# Patient Record
Sex: MALE | Race: WHITE | NOT HISPANIC OR LATINO | Employment: FULL TIME | URBAN - METROPOLITAN AREA
[De-identification: names, ages, dates, MRNs, and addresses within clinical notes are randomized per-mention and may not be internally consistent; named-entity substitution may affect disease eponyms.]

---

## 2017-03-27 ENCOUNTER — OFFICE VISIT - HEALTHEAST (OUTPATIENT)
Dept: INTERNAL MEDICINE | Facility: CLINIC | Age: 32
End: 2017-03-27

## 2017-03-27 DIAGNOSIS — Z00.00 ROUTINE GENERAL MEDICAL EXAMINATION AT A HEALTH CARE FACILITY: ICD-10-CM

## 2017-03-27 DIAGNOSIS — L65.9 ALOPECIA: ICD-10-CM

## 2017-03-27 DIAGNOSIS — F41.9 ANXIETY: ICD-10-CM

## 2017-03-27 DIAGNOSIS — F32.1 MAJOR DEPRESSIVE DISORDER, SINGLE EPISODE, MODERATE (H): ICD-10-CM

## 2017-03-27 ASSESSMENT — MIFFLIN-ST. JEOR: SCORE: 1700.1

## 2017-03-28 LAB
CHOLEST SERPL-MCNC: 244 MG/DL
FASTING STATUS PATIENT QL REPORTED: NO
HDLC SERPL-MCNC: 49 MG/DL
LDLC SERPL CALC-MCNC: 168 MG/DL
TRIGL SERPL-MCNC: 133 MG/DL

## 2017-03-31 ENCOUNTER — COMMUNICATION - HEALTHEAST (OUTPATIENT)
Dept: INTERNAL MEDICINE | Facility: CLINIC | Age: 32
End: 2017-03-31

## 2017-06-29 ENCOUNTER — COMMUNICATION - HEALTHEAST (OUTPATIENT)
Dept: INTERNAL MEDICINE | Facility: CLINIC | Age: 32
End: 2017-06-29

## 2017-06-29 DIAGNOSIS — G47.00 INSOMNIA: ICD-10-CM

## 2017-06-29 DIAGNOSIS — F32.1 MAJOR DEPRESSIVE DISORDER, SINGLE EPISODE, MODERATE (H): ICD-10-CM

## 2017-06-30 ENCOUNTER — COMMUNICATION - HEALTHEAST (OUTPATIENT)
Dept: INTERNAL MEDICINE | Facility: CLINIC | Age: 32
End: 2017-06-30

## 2017-07-11 ENCOUNTER — COMMUNICATION - HEALTHEAST (OUTPATIENT)
Dept: INTERNAL MEDICINE | Facility: CLINIC | Age: 32
End: 2017-07-11

## 2017-07-11 DIAGNOSIS — G47.00 INSOMNIA: ICD-10-CM

## 2017-07-11 DIAGNOSIS — F32.1 MAJOR DEPRESSIVE DISORDER, SINGLE EPISODE, MODERATE (H): ICD-10-CM

## 2018-03-26 ENCOUNTER — COMMUNICATION - HEALTHEAST (OUTPATIENT)
Dept: INTERNAL MEDICINE | Facility: CLINIC | Age: 33
End: 2018-03-26

## 2018-03-26 DIAGNOSIS — L65.9 ALOPECIA: ICD-10-CM

## 2018-07-03 ENCOUNTER — COMMUNICATION - HEALTHEAST (OUTPATIENT)
Dept: INTERNAL MEDICINE | Facility: CLINIC | Age: 33
End: 2018-07-03

## 2018-07-03 DIAGNOSIS — F32.1 MAJOR DEPRESSIVE DISORDER, SINGLE EPISODE, MODERATE (H): ICD-10-CM

## 2018-07-03 DIAGNOSIS — G47.00 INSOMNIA: ICD-10-CM

## 2018-08-30 ENCOUNTER — OFFICE VISIT - HEALTHEAST (OUTPATIENT)
Dept: INTERNAL MEDICINE | Facility: CLINIC | Age: 33
End: 2018-08-30

## 2018-08-30 ENCOUNTER — HOSPITAL ENCOUNTER (OUTPATIENT)
Dept: ULTRASOUND IMAGING | Facility: CLINIC | Age: 33
Discharge: HOME | End: 2018-08-30
Attending: INTERNAL MEDICINE

## 2018-08-30 ENCOUNTER — COMMUNICATION - HEALTHEAST (OUTPATIENT)
Dept: INTERNAL MEDICINE | Facility: CLINIC | Age: 33
End: 2018-08-30

## 2018-08-30 DIAGNOSIS — N50.811 TESTICULAR PAIN, RIGHT: ICD-10-CM

## 2018-08-30 DIAGNOSIS — N45.1 EPIDIDYMITIS, RIGHT: ICD-10-CM

## 2018-08-30 LAB
ALBUMIN UR-MCNC: NEGATIVE MG/DL
APPEARANCE UR: CLEAR
BILIRUB UR QL STRIP: NEGATIVE
COLOR UR AUTO: YELLOW
GLUCOSE UR STRIP-MCNC: NEGATIVE MG/DL
HGB UR QL STRIP: NEGATIVE
KETONES UR STRIP-MCNC: NEGATIVE MG/DL
LEUKOCYTE ESTERASE UR QL STRIP: NEGATIVE
NITRATE UR QL: NEGATIVE
PH UR STRIP: 7 [PH] (ref 5–8)
SP GR UR STRIP: 1.01 (ref 1–1.03)
UROBILINOGEN UR STRIP-ACNC: NORMAL

## 2018-08-30 ASSESSMENT — MIFFLIN-ST. JEOR: SCORE: 1699.19

## 2018-08-31 ENCOUNTER — COMMUNICATION - HEALTHEAST (OUTPATIENT)
Dept: INTERNAL MEDICINE | Facility: CLINIC | Age: 33
End: 2018-08-31

## 2018-08-31 LAB
C TRACH DNA SPEC QL PROBE+SIG AMP: NEGATIVE
N GONORRHOEA DNA SPEC QL NAA+PROBE: NEGATIVE

## 2018-11-17 ENCOUNTER — COMMUNICATION - HEALTHEAST (OUTPATIENT)
Dept: INTERNAL MEDICINE | Facility: CLINIC | Age: 33
End: 2018-11-17

## 2018-12-13 ENCOUNTER — COMMUNICATION - HEALTHEAST (OUTPATIENT)
Dept: INTERNAL MEDICINE | Facility: CLINIC | Age: 33
End: 2018-12-13

## 2018-12-13 DIAGNOSIS — L65.9 ALOPECIA: ICD-10-CM

## 2019-04-05 ENCOUNTER — COMMUNICATION - HEALTHEAST (OUTPATIENT)
Dept: INTERNAL MEDICINE | Facility: CLINIC | Age: 34
End: 2019-04-05

## 2019-06-25 ENCOUNTER — COMMUNICATION - HEALTHEAST (OUTPATIENT)
Dept: INTERNAL MEDICINE | Facility: CLINIC | Age: 34
End: 2019-06-25

## 2019-06-25 DIAGNOSIS — F32.1 MAJOR DEPRESSIVE DISORDER, SINGLE EPISODE, MODERATE (H): ICD-10-CM

## 2019-06-25 DIAGNOSIS — G47.00 INSOMNIA: ICD-10-CM

## 2019-10-05 ENCOUNTER — COMMUNICATION - HEALTHEAST (OUTPATIENT)
Dept: INTERNAL MEDICINE | Facility: CLINIC | Age: 34
End: 2019-10-05

## 2019-10-05 DIAGNOSIS — L65.9 ALOPECIA: ICD-10-CM

## 2020-06-27 ENCOUNTER — COMMUNICATION - HEALTHEAST (OUTPATIENT)
Dept: INTERNAL MEDICINE | Facility: CLINIC | Age: 35
End: 2020-06-27

## 2020-06-27 DIAGNOSIS — L65.9 ALOPECIA: ICD-10-CM

## 2020-06-29 ENCOUNTER — COMMUNICATION - HEALTHEAST (OUTPATIENT)
Dept: INTERNAL MEDICINE | Facility: CLINIC | Age: 35
End: 2020-06-29

## 2020-06-29 DIAGNOSIS — F32.1 MAJOR DEPRESSIVE DISORDER, SINGLE EPISODE, MODERATE (H): ICD-10-CM

## 2020-06-29 DIAGNOSIS — G47.00 INSOMNIA: ICD-10-CM

## 2021-04-04 ENCOUNTER — COMMUNICATION - HEALTHEAST (OUTPATIENT)
Dept: INTERNAL MEDICINE | Facility: CLINIC | Age: 36
End: 2021-04-04

## 2021-04-04 DIAGNOSIS — L65.9 ALOPECIA: ICD-10-CM

## 2021-05-30 VITALS — WEIGHT: 183.5 LBS | HEIGHT: 66 IN | BODY MASS INDEX: 29.49 KG/M2

## 2021-05-30 NOTE — TELEPHONE ENCOUNTER
RN cannot approve Refill Request    RN can NOT refill this medication Protocol failed and NO refill given.       Tomasa Ferris, [facility] Triage/Med Refill 6/26/2019    Requested Prescriptions   Pending Prescriptions Disp Refills     sertraline (ZOLOFT) 100 MG tablet [Pharmacy Med Name: SERTRALINE  MG TABLET] 135 tablet 3     Sig: TAKE 1 AND 1/2 TABLETS BY MOUTH ONCE DAILY       SSRI Refill Protocol  Failed - 6/25/2019  1:19 AM        Failed - PCP or prescribing provider visit in last year     Last office visit with prescriber/PCP: Visit date not found OR same dept: Visit date not found OR same specialty: 8/30/2018 Ros Nunez MD  Last physical: 3/27/2017 Last MTM visit: Visit date not found   Next visit within 3 mo: Visit date not found  Next physical within 3 mo: Visit date not found  Prescriber OR PCP: Barry Olea MD  Last diagnosis associated with med order: 1. Major depressive disorder, single episode, moderate (H)  - sertraline (ZOLOFT) 100 MG tablet [Pharmacy Med Name: SERTRALINE  MG TABLET]; TAKE 1 AND 1/2 TABLETS BY MOUTH ONCE DAILY  Dispense: 135 tablet; Refill: 3    2. Insomnia  - traZODone (DESYREL) 50 MG tablet [Pharmacy Med Name: TRAZODONE 50 MG TABLET]; TAKE 2 TABLETS BY MOUTH AT BEDTIME AS NEEDED FOR INSOMNIA  Dispense: 180 tablet; Refill: 3    If protocol passes may refill for 12 months if within 3 months of last provider visit (or a total of 15 months).             traZODone (DESYREL) 50 MG tablet [Pharmacy Med Name: TRAZODONE 50 MG TABLET] 180 tablet 3     Sig: TAKE 2 TABLETS BY MOUTH AT BEDTIME AS NEEDED FOR INSOMNIA       Tricyclics/Misc Antidepressant/Antianxiety Meds Refill Protocol Failed - 6/25/2019  1:19 AM        Failed - PCP or prescribing provider visit in last year     Last office visit with prescriber/PCP: Visit date not found OR same dept: Visit date not found OR same specialty: 8/30/2018 Ros Nunez MD  Last physical: 3/27/2017 Last MTM visit:  Visit date not found   Next visit within 3 mo: Visit date not found  Next physical within 3 mo: Visit date not found  Prescriber OR PCP: Barry Olea MD  Last diagnosis associated with med order: 1. Major depressive disorder, single episode, moderate (H)  - sertraline (ZOLOFT) 100 MG tablet [Pharmacy Med Name: SERTRALINE  MG TABLET]; TAKE 1 AND 1/2 TABLETS BY MOUTH ONCE DAILY  Dispense: 135 tablet; Refill: 3    2. Insomnia  - traZODone (DESYREL) 50 MG tablet [Pharmacy Med Name: TRAZODONE 50 MG TABLET]; TAKE 2 TABLETS BY MOUTH AT BEDTIME AS NEEDED FOR INSOMNIA  Dispense: 180 tablet; Refill: 3    If protocol passes may refill for 12 months if within 3 months of last provider visit (or a total of 15 months).

## 2021-06-01 VITALS — BODY MASS INDEX: 29.46 KG/M2 | WEIGHT: 183.3 LBS | HEIGHT: 66 IN

## 2021-06-09 NOTE — TELEPHONE ENCOUNTER
RN cannot approve Refill Request    RN can NOT refill this medication med is not covered by policy/route to provider. Last office visit: Visit date not found Last Physical: 3/27/2017 Last MTM visit: Visit date not found Last visit same specialty: 8/30/2018 Ros Nunez MD.  Next visit within 3 mo: Visit date not found  Next physical within 3 mo: Visit date not found      Mora Arndt, [facility] Triage/Med Refill 6/27/2020    Requested Prescriptions   Pending Prescriptions Disp Refills     finasteride (PROSCAR) 5 mg tablet [Pharmacy Med Name: FINASTERIDE 5 MG TABLET] 45 tablet 2     Sig: TAKE ONE-HALF TABLET (2.5 MG TOTAL) BY MOUTH DAILY.       There is no refill protocol information for this order

## 2021-06-09 NOTE — TELEPHONE ENCOUNTER
RN cannot approve Refill Request    RN can NOT refill this medication PCP messaged that patient is overdue for Office Visit. Last office visit: Visit date not found Last Physical: 3/27/2017 Last MTM visit: Visit date not found Last visit same specialty: 8/30/2018 Ros Nunez MD.  Next visit within 3 mo: Visit date not found  Next physical within 3 mo: Visit date not found      Marni Sherman, [facility] Connection Triage/Med Refill 6/29/2020    Requested Prescriptions   Pending Prescriptions Disp Refills     sertraline (ZOLOFT) 100 MG tablet [Pharmacy Med Name: SERTRALINE  MG TABLET] 135 tablet 3     Sig: TAKE 1 AND 1/2 TABLETS BY MOUTH ONCE DAILY       SSRI Refill Protocol  Failed - 6/29/2020 12:13 AM        Failed - PCP or prescribing provider visit in last year     Last office visit with prescriber/PCP: Visit date not found OR same dept: Visit date not found OR same specialty: 8/30/2018 Ros Nunez MD  Last physical: 3/27/2017 Last MTM visit: Visit date not found   Next visit within 3 mo: Visit date not found  Next physical within 3 mo: Visit date not found  Prescriber OR PCP: Barry Olea MD  Last diagnosis associated with med order: 1. Major depressive disorder, single episode, moderate (H)  - sertraline (ZOLOFT) 100 MG tablet [Pharmacy Med Name: SERTRALINE  MG TABLET]; TAKE 1 AND 1/2 TABLETS BY MOUTH ONCE DAILY  Dispense: 135 tablet; Refill: 3    2. Insomnia  - traZODone (DESYREL) 50 MG tablet [Pharmacy Med Name: TRAZODONE 50 MG TABLET]; TAKE 2 TABLETS BY MOUTH AT BEDTIME AS NEEDED FOR INSOMNIA  Dispense: 180 tablet; Refill: 3    If protocol passes may refill for 12 months if within 3 months of last provider visit (or a total of 15 months).                traZODone (DESYREL) 50 MG tablet [Pharmacy Med Name: TRAZODONE 50 MG TABLET] 180 tablet 3     Sig: TAKE 2 TABLETS BY MOUTH AT BEDTIME AS NEEDED FOR INSOMNIA       Tricyclics/Misc Antidepressant/Antianxiety Meds Refill Protocol  Failed - 6/29/2020 12:13 AM        Failed - PCP or prescribing provider visit in last year     Last office visit with prescriber/PCP: Visit date not found OR same dept: Visit date not found OR same specialty: 8/30/2018 Ros Nunez MD  Last physical: 3/27/2017 Last MTM visit: Visit date not found   Next visit within 3 mo: Visit date not found  Next physical within 3 mo: Visit date not found  Prescriber OR PCP: Barry Olea MD  Last diagnosis associated with med order: 1. Major depressive disorder, single episode, moderate (H)  - sertraline (ZOLOFT) 100 MG tablet [Pharmacy Med Name: SERTRALINE  MG TABLET]; TAKE 1 AND 1/2 TABLETS BY MOUTH ONCE DAILY  Dispense: 135 tablet; Refill: 3    2. Insomnia  - traZODone (DESYREL) 50 MG tablet [Pharmacy Med Name: TRAZODONE 50 MG TABLET]; TAKE 2 TABLETS BY MOUTH AT BEDTIME AS NEEDED FOR INSOMNIA  Dispense: 180 tablet; Refill: 3    If protocol passes may refill for 12 months if within 3 months of last provider visit (or a total of 15 months).

## 2021-06-09 NOTE — PROGRESS NOTES
ASSESSMENT:  1. Routine general medical examination at a health care facility  Stable.  Feels well.  Update labs  - Vitamin D, Total (25-Hydroxy)  - Lipid Cascade  - Glucose    2. Anxiety  Continue medications per psychiatry.  Given his stable course, I offered that we could take over these medications    3. Major depressive disorder, single episode, moderate  As above    4. Alopecia  Previously improved on trial of Propecia/finasteride.  Will resume    Erectile dysfunction.  Undoubtedly related at least in part to SSRI.  Okay for Cialis as needed    PLAN:  Patient Instructions   With family history of heart:    Be more aggressive on blood pressure, keeping it below 130/80 at all times.  Normally the range 140/90.  Monitor it periodically to make sure    I would be more aggressive.  Treat normally an LDL over 190 and no risk factors, and LDL over 160 with 2 risk factors.  You have one, a man.  You have 2, family.      Do not smoke    Take a baby aspirin to help prevent heart attack?      No cancer screening until age 50    No shots until age 60    Depending on how stable you are, we can take over the meds for psych    Finasteride 5 mgs, take one half pill every other day, equals roughly 1 mg daily    Sign up for mychart      Orders Placed This Encounter   Procedures     Vitamin D, Total (25-Hydroxy)     Lipid Cascade     Order Specific Question:   Fasting is required?     Answer:   Unknown     Glucose     There are no discontinued medications.    No Follow-up on file.    CHIEF COMPLAINT:  Chief Complaint   Patient presents with     Annual Exam       HISTORY OF PRESENT ILLNESS:  Wang is a 31 y.o. male presenting to the clinic today for a physical exam and to establish care. He has not a physical exam in several years. He has no major medical concerns and has been feeling well overall.     Depression: He has been prescribed Zoloft through [facility]. He follows up with his psychiatrist 2-3 times per year.  He was  admitted in 2008 for opiate addiction, and has been sober since.     Insomnia: This is managed through trazodone. He used to have difficulty falling asleep, and could not fall back asleep if he woke up in the middle of the night.     Erectile dysfunction: He has history of erectile dysfunction. This dates back to when he received treatment for opiate addiction in 2008. He is able to achieve an erection, but feels that they do not last long enough. He has avoided establishing relationships in the past due to lack of sexual performance. Of note, he was on finasteride in the past, and was warned that this could potential cause sexual dysfunction. He has thought that his sexual dysfunction is psychogenic.     Hair loss: He has taken finasteride in the past with some success. Now that he has been off of this medication, he has noticed more hair loss.     Family history: He notes that malignant hyperthermia runs in his family. His father recently had surgery, and developed muscle rigidity and high temperatures post operatively.     REVIEW OF SYSTEMS:   Psychological is positive for depressed mood and anxiety. He denies shortness of breath or dyspnea. He leads an active lifestyle. He denies arthralgias. Bowel movements are regular. He denies dysuria. He does have concerns of excessive thirst. All other systems are negative.    PFSH:  History   Smoking Status     Never Smoker   Smokeless Tobacco     Not on file       Family History   Problem Relation Age of Onset     Malig Hyperthermia Father      Heart attack Father 50     Aortic aneurysm Father      Heart disease Father      Hypertension Father      Mental illness Father      Heart attack Paternal Grandfather      Heart attack Paternal Uncle      Mental illness Mother      No Medical Problems Brother        Social History     Social History     Marital status: Single     Spouse name: N/A     Number of children: N/A     Years of education: N/A     Occupational History      " for [employer]      Social History Main Topics     Smoking status: Never Smoker     Smokeless tobacco: Not on file     Alcohol use No     Drug use: Not on file      Comment: previous opiate abuse     Sexual activity: Not on file     Other Topics Concern     Not on file     Social History Narrative    He does cardio and lifts weights 6 times per week.        Past Surgical History:   Procedure Laterality Date     NO PAST SURGERIES         No Known Allergies    Active Ambulatory Problems     Diagnosis Date Noted     Male Erectile Disorder      Opioid Abuse - In Remission      Blood Pressure Isolated Elevated      Anxiety 03/27/2017     Major depressive disorder, single episode, moderate 03/27/2017     Resolved Ambulatory Problems     Diagnosis Date Noted     No Resolved Ambulatory Problems     Past Medical History:   Diagnosis Date     Depression      Erectile dysfunction      Opiate addiction        VITALS:  Vitals:    03/27/17 1626   BP: 138/86   Pulse: 80   Weight: 183 lb 8 oz (83.2 kg)   Height: 5' 6\" (1.676 m)     Wt Readings from Last 3 Encounters:   03/27/17 183 lb 8 oz (83.2 kg)     Body mass index is 29.62 kg/(m^2).    PHYSICAL EXAM:  Constitutional:  Reveals an alert, pleasant, healthy appearing young male.  Vitals:  Per nursing notes.  Ears:  Clear.  Oropharynx:  Without posterior nasal drainage or thrush.  Neck:  Supple, thyroid not palpable.  Cardiac:  Regular rate and rhythm without murmurs, rubs, or gallops.  Legs without edema. Palpation of the radial pulse regular.  Lungs: Clear.  Respiratory effort normal.  Abdomen:   Bowel sounds positive, nontender, nondistended.  Neither liver nor spleen palpable.  Neurologic:  Cranial nerves II-XII intact.     Psychiatric:  Mood appropriate, memory intact.     QUALITY MEASURES:  The following high BMI interventions were performed this visit: encouragement to exercise    ADDITIONAL HISTORY SUMMARIZED (2): Reviewed Dr. Moran's note from 10/2/12 " regarding sexual dysfunction.   DECISION TO OBTAIN EXTRA INFORMATION (1): None.   RADIOLOGY TESTS (1): None.  LABS (1): Ordered labs today.   MEDICINE TESTS (1): None.  INDEPENDENT REVIEW (2 each): None.     The visit lasted a total of 24 minutes face to face with the patient. Over 50% of the time was spent counseling and educating the patient about health maintenance.    IAbbey, am scribing for and in the presence of, Dr. Olea.    I, Dr. Olea, personally performed the services described in this documentation, as scribed by Abbey Prescott in my presence, and it is both accurate and complete.    MEDICATIONS:  Current Outpatient Prescriptions   Medication Sig Dispense Refill     finasteride (PROSCAR) 5 mg tablet Take 0.5 tablets (2.5 mg total) by mouth daily. 45 tablet 3     sertraline (ZOLOFT) 100 MG tablet TAKE 1 AND 1/2 TABLETS BY MOUTH ONCE DAILY  1     tadalafil (CIALIS) 10 MG tablet Take 1 tablet (10 mg total) by mouth daily as needed for erectile dysfunction. 10 tablet 5     traZODone (DESYREL) 50 MG tablet TAKE 2 TABLETS BY MOUTH AT BEDTIME AS NEEDED FOR INSOMNIA  1     No current facility-administered medications for this visit.        Total data points: 3

## 2021-06-15 PROBLEM — F32.1 MAJOR DEPRESSIVE DISORDER, SINGLE EPISODE, MODERATE (H): Status: ACTIVE | Noted: 2017-03-27

## 2021-06-15 PROBLEM — F41.9 ANXIETY: Status: ACTIVE | Noted: 2017-03-27

## 2021-06-16 PROBLEM — G47.00 INSOMNIA: Status: ACTIVE | Noted: 2017-06-29

## 2021-06-16 NOTE — TELEPHONE ENCOUNTER
RN cannot approve Refill Request    RN can NOT refill this medication Protocol failed and NO refill given. Last office visit: Visit date not found Last Physical: 3/27/2017 Last MTM visit: Visit date not found Last visit same specialty: 8/30/2018 Ros Nunez MD.  Next visit within 3 mo: Visit date not found  Next physical within 3 mo: Visit date not found      Chantal Solomon, [facility] Triage/Med Refill 4/4/2021    Requested Prescriptions   Pending Prescriptions Disp Refills     finasteride (PROSCAR) 5 mg tablet [Pharmacy Med Name: FINASTERIDE 5 MG TABLET] 45 tablet 2     Sig: TAKE ONE-HALF TABLET (2.5 MG TOTAL) BY MOUTH DAILY.       There is no refill protocol information for this order

## 2021-06-20 NOTE — PROGRESS NOTES
ASSESSMENT and PLAN:  1. Testicular pain, right  I will get an ultrasound today.  In the differential, would be a testicular appendiceal torsion.  Also looking for infection; he denies the possibility of sexually transmitted disease.  Pending the results of the labs and ultrasound, will determine treatment course.  - US Scrotum and Testicles W Duplex Ltd; Future  - Urinalysis-UC if Indicated  - Chlamydia trachomatis & Neisseria gonorrhoeae, Amplified Detection    2. Epididymitis, right  His ultrasound was a read and call.  It was consistent with epididymitis but nothing requiring surgical intervention was noted.  He will be treated with 10 days of levofloxacin.  He will follow-up if he is worsening or not improving.  - levoFLOXacin (LEVAQUIN) 500 MG tablet; Take 1 tablet (500 mg total) by mouth daily for 10 days.  Dispense: 10 tablet; Refill: 0    There are no discontinued medications.    No Follow-up on file.    Patient Instructions   We'll get the ultrasound today:  [phone]    After that's scheduled, we'll have you leave a urine sample.    We'll be in touch when results are available this afternoon.    Take care!      CHIEF COMPLAINT:  Chief Complaint   Patient presents with     Testicle Pain     2 days- No specific injury       HISTORY OF PRESENT ILLNESS:  Wang Cano is a 32 y.o. male  presenting to the clinic today for evaluation of right testicular pain.  It started yesterday morning and got progressively worse throughout the day.  When he laid down overnight and woke up this morning, it felt a little bit better.  He has noticed no redness of the scrotum.  He has some mild swelling.  It is worse with movement.  He tried to exercise this morning but due to pain he was only able to do a couple repetitions and his weight lifting and then he had to stop.  He has had no prior abdominal surgeries.  He denies any concerns for sexually transmitted infection.  He notes that his right testicle is exquisitely  "sensitive right now.  He has not had any fevers or chills.  He has not had any penile discharge.    REVIEW OF SYSTEMS:    All other systems are negative.    PFSH:  Family History   Problem Relation Age of Onset     Malig Hyperthermia Father      Heart attack Father 50     Aortic aneurysm Father      Heart disease Father      Hypertension Father      Mental illness Father      Heart attack Paternal Grandfather      Heart attack Paternal Uncle      Mental illness Mother      No Medical Problems Brother      TOBACCO USE:  History   Smoking Status     Never Smoker   Smokeless Tobacco     Never Used       VITALS:  Vitals:    08/30/18 1252   BP: 130/76   Patient Site: Right Arm   Patient Position: Sitting   Cuff Size: Adult Large   Pulse: 84   Weight: 183 lb 4.8 oz (83.1 kg)   Height: 5' 6\" (1.676 m)     Wt Readings from Last 3 Encounters:   08/30/18 183 lb 4.8 oz (83.1 kg)   03/27/17 183 lb 8 oz (83.2 kg)         PHYSICAL EXAM:  Constitutional:  Reveals an alert, pleasant adult male.   Vitals:  Noted.   : Normal adult male external genitalia, penis is circumcised.  Right testicle is mildly swollen, he is tender superiorly and anteriorly.  No posterior tenderness, no hernias appreciated, no erythema or rash noted, no penile discharge    QUALITY MEASURES:  The following high BMI interventions were performed this visit: weight monitoring    MEDICATIONS:  Current Outpatient Prescriptions   Medication Sig Dispense Refill     finasteride (PROSCAR) 5 mg tablet Take 0.5 tablets (2.5 mg total) by mouth daily. 45 tablet 2     sertraline (ZOLOFT) 100 MG tablet TAKE 1 AND 1/2 TABLETS BY MOUTH ONCE DAILY 135 tablet 3     traZODone (DESYREL) 50 MG tablet TAKE 2 TABLETS BY MOUTH AT BEDTIME AS NEEDED FOR INSOMNIA 180 tablet 3     doxycycline (VIBRA-TABS) 100 MG tablet Take 1 tablet (100 mg total) by mouth 2 (two) times a day for 10 days. 20 tablet 0     levoFLOXacin (LEVAQUIN) 500 MG tablet Take 1 tablet (500 mg total) by mouth daily " for 10 days. 10 tablet 0     No current facility-administered medications for this visit.

## 2021-06-27 ENCOUNTER — HEALTH MAINTENANCE LETTER (OUTPATIENT)
Age: 36
End: 2021-06-27

## 2021-07-03 ENCOUNTER — COMMUNICATION - HEALTHEAST (OUTPATIENT)
Dept: INTERNAL MEDICINE | Facility: CLINIC | Age: 36
End: 2021-07-03

## 2021-07-03 DIAGNOSIS — G47.00 INSOMNIA: ICD-10-CM

## 2021-07-03 DIAGNOSIS — F32.1 MAJOR DEPRESSIVE DISORDER, SINGLE EPISODE, MODERATE (H): ICD-10-CM

## 2021-10-17 ENCOUNTER — HEALTH MAINTENANCE LETTER (OUTPATIENT)
Age: 36
End: 2021-10-17

## 2022-01-03 DIAGNOSIS — L65.9 ALOPECIA: Primary | ICD-10-CM

## 2022-01-03 RX ORDER — FINASTERIDE 5 MG/1
5 TABLET, FILM COATED ORAL
COMMUNITY
Start: 2021-04-05 | End: 2022-01-03

## 2022-01-06 RX ORDER — FINASTERIDE 5 MG/1
5 TABLET, FILM COATED ORAL EVERY OTHER DAY
Qty: 45 TABLET | Refills: 3 | Status: SHIPPED | OUTPATIENT
Start: 2022-01-06 | End: 2023-01-01

## 2022-01-06 NOTE — TELEPHONE ENCOUNTER
"Routing refill request to provider for review/approval because:  Patient needs to be seen because it has been more than 1 year since last office visit.    Last Written Prescription Date:  4/5/21  Last Fill Quantity: 45,  # refills: 2   Last office visit provider: 8/30/18      Requested Prescriptions   Pending Prescriptions Disp Refills     finasteride (PROSCAR) 5 MG tablet       Sig: Take 1 tablet (5 mg) by mouth       BPH Agents Failed - 1/3/2022 12:19 PM        Failed - Recent (12 mo) or future (30 days) visit within the authorizing provider's department     Patient has had an office visit with the authorizing provider or a provider within the authorizing providers department within the previous 12 mos or has a future within next 30 days. See \"Patient Info\" tab in inbasket, or \"Choose Columns\" in Meds & Orders section of the refill encounter.              Passed - Medication is active on med list        Passed - Patient is 18 years of age or older         Signed Prescriptions Disp Refills    finasteride (PROSCAR) 5 MG tablet       Sig: Take 5 mg by mouth       There is no refill protocol information for this order          Monie Zavala RN 01/06/22 10:40 AM  "

## 2022-07-05 DIAGNOSIS — F32.1 MAJOR DEPRESSIVE DISORDER, SINGLE EPISODE, MODERATE (H): ICD-10-CM

## 2022-07-05 DIAGNOSIS — G47.00 INSOMNIA, UNSPECIFIED: ICD-10-CM

## 2022-07-05 RX ORDER — SERTRALINE HYDROCHLORIDE 100 MG/1
TABLET, FILM COATED ORAL
Qty: 135 TABLET | Refills: 3 | OUTPATIENT
Start: 2022-07-05

## 2022-07-05 RX ORDER — TRAZODONE HYDROCHLORIDE 50 MG/1
TABLET, FILM COATED ORAL
Qty: 180 TABLET | Refills: 3 | OUTPATIENT
Start: 2022-07-05

## 2022-07-11 ENCOUNTER — TELEPHONE (OUTPATIENT)
Dept: INTERNAL MEDICINE | Facility: CLINIC | Age: 37
End: 2022-07-11

## 2022-07-11 RX ORDER — TRAZODONE HYDROCHLORIDE 50 MG/1
TABLET, FILM COATED ORAL
Qty: 180 TABLET | Refills: 3 | OUTPATIENT
Start: 2022-07-11

## 2022-07-11 NOTE — TELEPHONE ENCOUNTER
Reason for Call:  Other call back    Detailed comments: pt has not been seen since 2018, asking for refill of Trazadone    Needs to make a new patient appt    Please advise    Please call patient when OK by Dr Olea    Phone Number Patient can be reached at: Cell number on file:    Telephone Information:   Mobile [phone]       Best Time: anytime    Can we leave a detailed message on this number? YES         Call taken on 7/11/2022 at 3:20 PM by Yuli Burgess

## 2022-07-13 ENCOUNTER — VIRTUAL VISIT (OUTPATIENT)
Dept: INTERNAL MEDICINE | Facility: CLINIC | Age: 37
End: 2022-07-13
Payer: COMMERCIAL

## 2022-07-13 DIAGNOSIS — F32.1 MAJOR DEPRESSIVE DISORDER, SINGLE EPISODE, MODERATE (H): ICD-10-CM

## 2022-07-13 DIAGNOSIS — F11.11 OPIOID ABUSE, IN REMISSION (H): ICD-10-CM

## 2022-07-13 DIAGNOSIS — F41.1 GENERALIZED ANXIETY DISORDER: Primary | ICD-10-CM

## 2022-07-13 DIAGNOSIS — F51.01 PRIMARY INSOMNIA: ICD-10-CM

## 2022-07-13 DIAGNOSIS — Z11.4 SCREENING FOR HIV (HUMAN IMMUNODEFICIENCY VIRUS): ICD-10-CM

## 2022-07-13 DIAGNOSIS — Z13.220 SCREENING FOR HYPERLIPIDEMIA: ICD-10-CM

## 2022-07-13 DIAGNOSIS — Z11.59 NEED FOR HEPATITIS C SCREENING TEST: ICD-10-CM

## 2022-07-13 DIAGNOSIS — Z00.00 PREVENTATIVE HEALTH CARE: ICD-10-CM

## 2022-07-13 PROCEDURE — 99204 OFFICE O/P NEW MOD 45 MIN: CPT | Mod: 95 | Performed by: INTERNAL MEDICINE

## 2022-07-13 PROCEDURE — 96127 BRIEF EMOTIONAL/BEHAV ASSMT: CPT | Mod: 95 | Performed by: INTERNAL MEDICINE

## 2022-07-13 RX ORDER — TRAZODONE HYDROCHLORIDE 50 MG/1
TABLET, FILM COATED ORAL
Qty: 180 TABLET | Refills: 3 | Status: CANCELLED | OUTPATIENT
Start: 2022-07-13

## 2022-07-13 RX ORDER — SERTRALINE HYDROCHLORIDE 100 MG/1
150 TABLET, FILM COATED ORAL DAILY
Qty: 135 TABLET | Refills: 3 | Status: SHIPPED | OUTPATIENT
Start: 2022-07-13 | End: 2023-06-30

## 2022-07-13 RX ORDER — TRAZODONE HYDROCHLORIDE 50 MG/1
TABLET, FILM COATED ORAL
COMMUNITY
Start: 2021-07-03 | End: 2022-07-13

## 2022-07-13 RX ORDER — TRAZODONE HYDROCHLORIDE 100 MG/1
100 TABLET ORAL AT BEDTIME
Qty: 90 TABLET | Refills: 3 | Status: SHIPPED | OUTPATIENT
Start: 2022-07-13 | End: 2023-06-30

## 2022-07-13 ASSESSMENT — PATIENT HEALTH QUESTIONNAIRE - PHQ9
SUM OF ALL RESPONSES TO PHQ QUESTIONS 1-9: 1
SUM OF ALL RESPONSES TO PHQ QUESTIONS 1-9: 1

## 2022-07-13 NOTE — PROGRESS NOTES
Wang is a 36 year old male contacting the clinic today via video, who will use the platform: [employer] for the visit.  Phone # for [employer], or if Amwell drops:   Telephone Information:   Mobile [phone]          ASSESSMENT and PLAN:  1. Generalized anxiety disorder  Clarify meds and refill  - sertraline (ZOLOFT) 100 MG tablet; Take 1.5 tablets (150 mg) by mouth daily  Dispense: 135 tablet; Refill: 3  - traZODone (DESYREL) 100 MG tablet; Take 1 tablet (100 mg) by mouth At Bedtime  Dispense: 90 tablet; Refill: 3    2. Major depressive disorder, single episode, moderate (H)  - sertraline (ZOLOFT) 100 MG tablet; Take 1.5 tablets (150 mg) by mouth daily  Dispense: 135 tablet; Refill: 3  - traZODone (DESYREL) 100 MG tablet; Take 1 tablet (100 mg) by mouth At Bedtime  Dispense: 90 tablet; Refill: 3  - Basic metabolic panel; Future  - TSH; Future  - Vitamin B12; Future  - Vitamin D Deficiency; Future    3. Primary insomnia  - traZODone (DESYREL) 100 MG tablet; Take 1 tablet (100 mg) by mouth At Bedtime  Dispense: 90 tablet; Refill: 3    4. Screening for HIV (human immunodeficiency virus)  - HIV Antigen Antibody Combo; Future    5. Need for hepatitis C screening test  - Hepatitis C Screen Reflex to HCV RNA Quant and Genotype; Future    6. Screening for hyperlipidemia  - Lipid panel reflex to direct LDL Fasting; Future    7. Preventative health care  - REVIEW OF HEALTH MAINTENANCE PROTOCOL ORDERS  - HIV Antigen Antibody Combo; Future  - Hepatitis C Screen Reflex to HCV RNA Quant and Genotype; Future  - TDAP VACCINE (Adacel, Boostrix); Future  - Lipid panel reflex to direct LDL Fasting; Future    8. Opioid Abuse - In Remission  Sober and off OxyContin since 2008       Patient Instructions   Clarify and refill sertraline and trazodone    Change trazodone to 100 mg tabs at bed    Update labs    Tdap            Return in about 1 year (around 7/13/2023) for using a video visit.       CHIEF COMPLAINT:  Chief Complaint   Patient  "presents with     Re-Establish care      Last Seen 2018     Medication Follow-up       HISTORY OF PRESENT ILLNESS:  Wang is a 36 year old male contacting the clinic today via video for medication review and to plug back in.  He has not been seen for a few years.  He takes sertraline 150 mg and trazodone 100 mg daily and has for many years.  He wishes to reestablish care.  Current dosages are doing well    He also takes finasteride for hair loss    Health maintenance reviewed.  He feels well    REVIEW OF SYSTEMS:   No pain    PFSH:  Social History     Social History Narrative    Single    Sales for security       TOBACCO USE:  History   Smoking Status     Never Smoker   Smokeless Tobacco     Never Used       VITALS:  There were no vitals filed for this visit.  There were no vitals taken for this visit. Estimated body mass index is 29.59 kg/m  as calculated from the following:    Height as of 8/30/18: 1.676 m (5' 6\").    Weight as of 8/30/18: 83.1 kg (183 lb 4.8 oz).    PHYSICAL EXAM:  (observations via Video)  Alert and oriented    MEDICATIONS:   Current Outpatient Medications   Medication Sig Dispense Refill     finasteride (PROSCAR) 5 MG tablet Take 1 tablet (5 mg) by mouth every other day 45 tablet 3     sertraline (ZOLOFT) 100 MG tablet Take 1.5 tablets (150 mg) by mouth daily 135 tablet 3     traZODone (DESYREL) 100 MG tablet Take 1 tablet (100 mg) by mouth At Bedtime 90 tablet 3       Outside Notes summarized:   Labs, x-rays, cardiology, GI tests reviewed:   No results for input(s): HGB, NA, POTASSIUM, CR, A1C, PSA, URIC, B12, TSH, VITDT in the last 61679 hours.  No results found for: LPLRN29GZC  No results found for: VITDT  Lab Results   Component Value Date    CHOL 244 03/27/2017     New orders:   Orders Placed This Encounter   Procedures     REVIEW OF HEALTH MAINTENANCE PROTOCOL ORDERS     TDAP VACCINE (Adacel, Boostrix)     HIV Antigen Antibody Combo     Hepatitis C Screen Reflex to HCV RNA Quant and " Genotype     Lipid panel reflex to direct LDL Fasting     Basic metabolic panel     TSH     Vitamin B12     Vitamin D Deficiency       Independent review of:    Supplemental history by:      Patient has given verbal consent to a Video visit?  Yes  How would you like to obtain your AVS?  MyChart  Will anyone else be joining your video visit? No        Video-Visit Details  Type of service:  Video Visit  Originating Location (pt. Location): Home  Distant Location (provider location):   [facility]    History of Present Illness       Reason for visit:  Medication Questions    He eats 2-3 servings of fruits and vegetables daily.He consumes 0 sweetened beverage(s) daily.He exercises with enough effort to increase his heart rate 20 to 29 minutes per day.  He exercises with enough effort to increase his heart rate 3 or less days per week.   He is taking medications regularly.    Today's PHQ-9         PHQ-9 Total Score: 1    PHQ-9 Q9 Thoughts of better off dead/self-harm past 2 weeks :   Not at all        Barry Olea MD      [facility]    Video Start Time: 2:14 PM  Video End time:  2:33 PM  Face to face plus orders: 19 minutes  Documentation time:  3 minutes     The visit lasted a total of 20 minutes

## 2022-07-14 ENCOUNTER — TELEPHONE (OUTPATIENT)
Dept: INTERNAL MEDICINE | Facility: CLINIC | Age: 37
End: 2022-07-14

## 2022-07-14 NOTE — TELEPHONE ENCOUNTER
----- Message from Barry Olea MD sent at 7/13/2022  2:29 PM CDT -----  Please schedule lab appointment and tdap

## 2022-10-01 ENCOUNTER — HEALTH MAINTENANCE LETTER (OUTPATIENT)
Age: 37
End: 2022-10-01

## 2022-12-26 ENCOUNTER — OFFICE VISIT (OUTPATIENT)
Dept: FAMILY MEDICINE | Facility: CLINIC | Age: 37
End: 2022-12-26
Payer: COMMERCIAL

## 2022-12-26 VITALS
OXYGEN SATURATION: 97 % | TEMPERATURE: 98 F | HEART RATE: 69 BPM | SYSTOLIC BLOOD PRESSURE: 145 MMHG | DIASTOLIC BLOOD PRESSURE: 87 MMHG | RESPIRATION RATE: 16 BRPM

## 2022-12-26 DIAGNOSIS — R03.0 ELEVATED BLOOD PRESSURE READING WITHOUT DIAGNOSIS OF HYPERTENSION: ICD-10-CM

## 2022-12-26 DIAGNOSIS — L29.9 ITCH OF SKIN: Primary | ICD-10-CM

## 2022-12-26 PROCEDURE — 99203 OFFICE O/P NEW LOW 30 MIN: CPT | Performed by: NURSE PRACTITIONER

## 2022-12-26 RX ORDER — TRIAMCINOLONE ACETONIDE 1 MG/G
OINTMENT TOPICAL 2 TIMES DAILY PRN
Qty: 15 G | Refills: 1 | Status: SHIPPED | OUTPATIENT
Start: 2022-12-26

## 2022-12-26 ASSESSMENT — ENCOUNTER SYMPTOMS
CARDIOVASCULAR NEGATIVE: 1
GASTROINTESTINAL NEGATIVE: 1
MUSCULOSKELETAL NEGATIVE: 1
HEMATOLOGIC/LYMPHATIC NEGATIVE: 1
EYES NEGATIVE: 1
RESPIRATORY NEGATIVE: 1
PSYCHIATRIC NEGATIVE: 1
NEUROLOGICAL NEGATIVE: 1
CONSTITUTIONAL NEGATIVE: 1

## 2022-12-26 NOTE — PROGRESS NOTES
Assessment & Plan     Itch of skin    - triamcinolone (KENALOG) 0.1 % external ointment  Dispense: 15 g; Refill: 1    Elevated blood pressure reading without diagnosis of hypertension    -I discussed possible etiologies of itchy skin.  He will continue to use lotion, take cooler showers, and be mindful of his diet.  He can use steroid cream if needed, and can also take Zyrtec at night.  He may take 2 tablets at night, the rationale for this medication has been discussed.  If his symptoms are ongoing he can follow-up with dermatology.   -Blood pressure is elevated.  Low-salt diet discussed.  Weight loss discussed.  I discussed that he should follow-up with his primary to recheck his blood pressure in 1 to 2 weeks.  I reviewed prior labs.                  Please follow up within the week if symptoms do not improve or worsen.     ALYSSA Gomes CNP [facility]    Kem Piña is a 36 year old, presenting for the following health issues:  Derm Problem (Itchy red skin yasmany all over)    -For the last week he has been itching his skin.  Sometimes this will occur in the middle the night.  He is not necessarily noticing any rashes but if he itches it long enough he will develop more redness, bumps on his skin, almost hive like appearance.  He has not changed his detergents or soaps or lotions.  He has been taking cooler showers, applying lotions that are used for dry skin, and taking Benadryl.  He denied any new food choices or supplements.  He denied any new animals in his household.  He denied any other symptoms such as weight loss, fevers, abdominal pain, cough, shortness of breath, night sweats, or viruses recently.  He denied any new stressors in life.     HPI           Review of Systems   Constitutional: Negative.    HENT: Negative.    Eyes: Negative.    Respiratory: Negative.    Cardiovascular: Negative.    Gastrointestinal: Negative.    Genitourinary: Negative.     Musculoskeletal: Negative.    Skin:        Itch of skin. No rashes   Neurological: Negative.    Hematological: Negative.    Psychiatric/Behavioral: Negative.             Objective    BP (!) 145/87   Pulse 69   Temp 98  F (36.7  C) (Oral)   Resp 16   SpO2 97%   There is no height or weight on file to calculate BMI.  Physical Exam  Vitals and nursing note reviewed.   Pulmonary:      Effort: Pulmonary effort is normal.   Skin:     General: Skin is warm and dry.      Coloration: Skin is not jaundiced or pale.      Findings: No bruising, lesion or rash.   Neurological:      General: No focal deficit present.      Mental Status: He is oriented to person, place, and time.   Psychiatric:         Mood and Affect: Mood normal.         Behavior: Behavior normal.         Thought Content: Thought content normal.         Judgment: Judgment normal.

## 2022-12-31 DIAGNOSIS — L65.9 ALOPECIA: ICD-10-CM

## 2023-01-01 RX ORDER — FINASTERIDE 5 MG/1
1 TABLET, FILM COATED ORAL EVERY OTHER DAY
Qty: 45 TABLET | Refills: 3 | Status: SHIPPED | OUTPATIENT
Start: 2023-01-01 | End: 2023-12-26

## 2023-01-01 NOTE — TELEPHONE ENCOUNTER
"Last Written Prescription Date:  1/6/22  Last Fill Quantity: 45,  # refills: 3   Last office visit provider:  12/26/22     Requested Prescriptions   Pending Prescriptions Disp Refills     finasteride (PROSCAR) 5 MG tablet [Pharmacy Med Name: FINASTERIDE 5 MG TABLET] 45 tablet 3     Sig: TAKE 1 TABLET BY MOUTH EVERY OTHER DAY       BPH Agents Passed - 1/1/2023  8:46 AM        Passed - Recent (12 mo) or future (30 days) visit within the authorizing provider's department     Patient has had an office visit with the authorizing provider or a provider within the authorizing providers department within the previous 12 mos or has a future within next 30 days. See \"Patient Info\" tab in inbasket, or \"Choose Columns\" in Meds & Orders section of the refill encounter.              Passed - Medication is active on med list        Passed - Patient is 18 years of age or older             Shahzad Nelson RN 01/01/23 8:46 AM  "

## 2023-05-01 ENCOUNTER — E-VISIT (OUTPATIENT)
Dept: INTERNAL MEDICINE | Facility: CLINIC | Age: 38
End: 2023-05-01
Payer: COMMERCIAL

## 2023-05-01 PROCEDURE — 99207 PR NON-BILLABLE SERV PER CHARTING: CPT | Performed by: INTERNAL MEDICINE

## 2023-05-05 ENCOUNTER — TELEPHONE (OUTPATIENT)
Dept: INTERNAL MEDICINE | Facility: CLINIC | Age: 38
End: 2023-05-05
Payer: COMMERCIAL

## 2023-05-05 NOTE — TELEPHONE ENCOUNTER
MTM referral from: [facility] visit (referral by provider)    MT referral outreach attempt #2 on May 5, 2023 at 9:46 AM      Outcome: Patient not reachable after several attempts, will route to [facility] Pharmacist/Provider as an FYI.  [facility] scheduling number is [phone].  Thank you for the referral.    Use [employer] OOS for the carrier/Plan on the flowsheet    Madi Solano [facility]

## 2023-05-09 ENCOUNTER — TELEPHONE (OUTPATIENT)
Dept: INTERNAL MEDICINE | Facility: CLINIC | Age: 38
End: 2023-05-09
Payer: COMMERCIAL

## 2023-05-09 ENCOUNTER — TELEPHONE (OUTPATIENT)
Dept: PHARMACY | Facility: CLINIC | Age: 38
End: 2023-05-09
Payer: COMMERCIAL

## 2023-05-09 ENCOUNTER — VIRTUAL VISIT (OUTPATIENT)
Dept: PHARMACY | Facility: CLINIC | Age: 38
End: 2023-05-09
Attending: INTERNAL MEDICINE
Payer: COMMERCIAL

## 2023-05-09 DIAGNOSIS — E66.01 MORBID OBESITY (H): Primary | ICD-10-CM

## 2023-05-09 DIAGNOSIS — E66.811 CLASS 1 OBESITY WITHOUT SERIOUS COMORBIDITY WITH BODY MASS INDEX (BMI) OF 32.0 TO 32.9 IN ADULT, UNSPECIFIED OBESITY TYPE: Primary | ICD-10-CM

## 2023-05-09 PROCEDURE — 99207 PR NO CHARGE LOS: CPT

## 2023-05-09 NOTE — PROGRESS NOTES
"  Clinical Pharmacy Consult:                                                    Wang Briscoe is a 37 year old male called for a clinical pharmacist consult.  He was referred to me from Dr. Olea.     Reason for Consult: Weight loss medication       Discussion: Class I Obesity (BMI 30 to 34.9): Current medication(s) include: Not taking any medication at this time.       Nutrition/Eating Habits: Patient reports:  During the day whole meal not snacking.  Snacking at night has been an issue.   Exercise/Activity: Patient reports: Goes to the gym four times out of the week.    Failed medications include: None, never.     Wt Readings from Last 4 Encounters:   08/30/18 183 lb 4.8 oz (83.1 kg)   03/27/17 183 lb 8 oz (83.2 kg)     Estimated body mass index is 29.59 kg/m  as calculated from the following:    Height as of 8/30/18: 5' 6\" (1.676 m).    Weight as of 8/30/18: 183 lb 4.8 oz (83.1 kg).    Current BMI per patient is 32:     Per diabetes liaison test claim: Mounjaro- MUST USE OZEMPIC, RYBELSUS, TRULICITY, VICTOZA. Wegovy- DRUG REQUIRES PRIOR AUTHORIZATION.     We will be doing prior authorization for wegovy.    Plan:  1.  Pharm D to do PA for Wegovy   2.  If approved, please communicate with patient and educate on how to use the medication  3.  If PA denied, do appeal or consider what insurance recommend for weight loss      I spent 10 minutes with this patient today. All changes were made via collaborative practice agreement with Barry Olea MD. A copy of the visit note was provided to the patient's provider(s).    A summary of these recommendations was sent via Actions.      Telemedicine Visit Details  Type of service:  Telephone visit  Start Time: 11:00 AM  End Time: 11:10 AM      Joe García, PharmD     Medication Therapy Management (MTM) Pharmacist     [phone]     [email or web address]      "

## 2023-05-09 NOTE — PATIENT INSTRUCTIONS
"Recommendations from today's MTM visit:                                                    MTM (medication therapy management) is a service provided by a clinical pharmacist designed to help you get the most of out of your medicines.   Today we reviewed what your medicines are for, how to know if they are working, that your medicines are safe and how to make your medicine regimen as easy as possible.      1.  Pharm D to do PA for Wegovy   2.  If approved, please communicate with patient and educate on how to use the medication  3.  If PA denied, do appeal or consider what insurance recommend for weight loss    Follow-up: When needed     It was great speaking with you today.  I value your experience and would be very thankful for your time in providing feedback in our clinic survey. In the next few days, you may receive an email or text message from [employer] with a link to a survey related to your  clinical pharmacist.\"     To schedule another MTM appointment, please call the clinic directly or you may call the MTM scheduling line at [phone] or toll-free at [phone].     My Clinical Pharmacist's contact information:                                                      Please feel free to contact me with any questions or concerns you have.        Joe García, PharmD     Medication Therapy Management (MTM) Pharmacist     [phone]     [email or web address]     [facility]    "

## 2023-05-09 NOTE — TELEPHONE ENCOUNTER
Prior Authorization Specialty Medication Request    Medication/Dose: Wegovy   ICD code (if different than what is on RX):   Previously Tried and Failed: None    Important Lab Values:  Current BMI is about 32   Rationale: Untreated condition(Obesity), and Wegovy is FDA approved for weight loss    Insurance Name: [facility] OUT OF STATE   Insurance ID: [id number]    Insurance Phone Number: [phone]           Joe García, PharmD     Medication Therapy Management (MTM) Pharmacist     [phone]     [email or web address]

## 2023-05-09 NOTE — TELEPHONE ENCOUNTER
PRIOR AUTHORIZATION DENIED    Medication: Wegovy - EPA DENIAL    Denial Date: 5/9/2023    Denial Rational: INSURANCE STATES PATIENT DID NOT MEET COVERAGE CRITERIA -        Appeal Information:  IF YOU WOULD LIKE TO APPEAL PLEASE SUPPLY PA TEAM WITH A LETTER OF MEDICAL NECESSITY WITH CLINICAL REASON.

## 2023-05-12 ENCOUNTER — TELEPHONE (OUTPATIENT)
Dept: INTERNAL MEDICINE | Facility: CLINIC | Age: 38
End: 2023-05-12
Payer: COMMERCIAL

## 2023-05-12 NOTE — TELEPHONE ENCOUNTER
Insurance denied Wegovy because patient did not have weight- associated comorbidity such as hypertension, diabetes type 2, and hyperlipidemia... etc. Per our previous discussion, patient was looking into possibly covering the price out of pocket, and we can look into coupons that will lower the high Wegovy price.     Called patient and LVM X1.       Will discuss further moving forward.       Thanks,    Joe García, PharmD     Medication Therapy Management (MTM) Pharmacist     [phone]     [email or web address]

## 2023-05-15 ENCOUNTER — MYC MEDICAL ADVICE (OUTPATIENT)
Dept: INTERNAL MEDICINE | Facility: CLINIC | Age: 38
End: 2023-05-15
Payer: COMMERCIAL

## 2023-05-16 ENCOUNTER — TELEPHONE (OUTPATIENT)
Dept: INTERNAL MEDICINE | Facility: CLINIC | Age: 38
End: 2023-05-16
Payer: COMMERCIAL

## 2023-05-16 DIAGNOSIS — E66.01 MORBID OBESITY (H): Primary | ICD-10-CM

## 2023-05-16 RX ORDER — TIRZEPATIDE 2.5 MG/.5ML
2.5 INJECTION, SOLUTION SUBCUTANEOUS
Qty: 2 ML | Refills: 0 | Status: SHIPPED | OUTPATIENT
Start: 2023-05-16 | End: 2023-06-15

## 2023-05-16 NOTE — TELEPHONE ENCOUNTER
Ordered Mounjaro with coupon per patient's preference.  If this does not work, we will try Wegovy.                 Joe García, PharmD     Medication Therapy Management (MTM) Pharmacist     [phone]     [email or web address]

## 2023-06-15 ENCOUNTER — MYC MEDICAL ADVICE (OUTPATIENT)
Dept: INTERNAL MEDICINE | Facility: CLINIC | Age: 38
End: 2023-06-15
Payer: COMMERCIAL

## 2023-06-15 ENCOUNTER — MYC MEDICAL ADVICE (OUTPATIENT)
Dept: PHARMACY | Facility: CLINIC | Age: 38
End: 2023-06-15
Payer: COMMERCIAL

## 2023-06-15 DIAGNOSIS — E66.811 CLASS 1 OBESITY WITHOUT SERIOUS COMORBIDITY WITH BODY MASS INDEX (BMI) OF 32.0 TO 32.9 IN ADULT, UNSPECIFIED OBESITY TYPE: Primary | ICD-10-CM

## 2023-06-15 DIAGNOSIS — E66.01 MORBID OBESITY (H): ICD-10-CM

## 2023-06-15 RX ORDER — TIRZEPATIDE 5 MG/.5ML
5 INJECTION, SOLUTION SUBCUTANEOUS
Qty: 2 ML | Refills: 0 | Status: SHIPPED | OUTPATIENT
Start: 2023-06-15 | End: 2023-07-14

## 2023-06-15 NOTE — TELEPHONE ENCOUNTER
Increased Mounjaro dose to 5 mg weekly for four weeks since the test dose is tolerated. Order sent to the [facility] [place].         Thanks,  Joe

## 2023-06-16 RX ORDER — TIRZEPATIDE 2.5 MG/.5ML
2.5 INJECTION, SOLUTION SUBCUTANEOUS
Qty: 2 ML | Refills: 11 | Status: SHIPPED | OUTPATIENT
Start: 2023-06-16 | End: 2023-07-14

## 2023-06-30 DIAGNOSIS — F32.1 MAJOR DEPRESSIVE DISORDER, SINGLE EPISODE, MODERATE (H): ICD-10-CM

## 2023-06-30 DIAGNOSIS — F41.1 GENERALIZED ANXIETY DISORDER: ICD-10-CM

## 2023-06-30 DIAGNOSIS — F51.01 PRIMARY INSOMNIA: ICD-10-CM

## 2023-06-30 RX ORDER — TRAZODONE HYDROCHLORIDE 100 MG/1
TABLET ORAL
Qty: 90 TABLET | Refills: 1 | Status: SHIPPED | OUTPATIENT
Start: 2023-06-30 | End: 2023-12-26

## 2023-06-30 RX ORDER — SERTRALINE HYDROCHLORIDE 100 MG/1
TABLET, FILM COATED ORAL
Qty: 135 TABLET | Refills: 3 | Status: SHIPPED | OUTPATIENT
Start: 2023-06-30 | End: 2024-07-02

## 2023-06-30 NOTE — TELEPHONE ENCOUNTER
"    Last Written Prescription Date:  7/13/22  Last Fill Quantity: 90,  # refills: 3   Last office visit provider:  12/26/22     Requested Prescriptions   Pending Prescriptions Disp Refills    traZODone (DESYREL) 100 MG tablet [Pharmacy Med Name: TRAZODONE 100 MG TABLET] 90 tablet 3     Sig: TAKE 1 TABLET BY MOUTH AT BEDTIME       Serotonin Modulators Passed - 6/30/2023 12:22 AM        Passed - Recent (12 mo) or future (30 days) visit within the authorizing provider's specialty     Patient has had an office visit with the authorizing provider or a provider within the authorizing providers department within the previous 12 mos or has a future within next 30 days. See \"Patient Info\" tab in inbasket, or \"Choose Columns\" in Meds & Orders section of the refill encounter.              Passed - Medication is active on med list        Passed - Patient is age 18 or older          sertraline (ZOLOFT) 100 MG tablet [Pharmacy Med Name: SERTRALINE  MG TABLET] 135 tablet 3     Sig: TAKE 1.5 TABLETS BY MOUTH DAILY       SSRIs Protocol Failed - 6/30/2023 12:22 AM        Failed - PHQ-9 score less than 5 in past 6 months     Please review last PHQ-9 score.           Passed - Medication is active on med list        Passed - Patient is age 18 or older        Passed - Recent (6 mo) or future (30 days) visit within the authorizing provider's specialty     Patient had office visit in the last 6 months or has a visit in the next 30 days with authorizing provider or within the authorizing provider's specialty.  See \"Patient Info\" tab in inbasket, or \"Choose Columns\" in Meds & Orders section of the refill encounter.                 Ericka Gutierrez RN 06/30/23 12:38 PM  "

## 2023-06-30 NOTE — TELEPHONE ENCOUNTER
"Routing refill request to provider for review/approval because:  PHQ9 out of date    Last Written Prescription Date:  7/13/22  Last Fill Quantity: 135,  # refills: 3   Last office visit provider:  12/26/22     Requested Prescriptions   Pending Prescriptions Disp Refills    sertraline (ZOLOFT) 100 MG tablet [Pharmacy Med Name: SERTRALINE  MG TABLET] 135 tablet 3     Sig: TAKE 1.5 TABLETS BY MOUTH DAILY       SSRIs Protocol Failed - 6/30/2023 12:22 AM        Failed - PHQ-9 score less than 5 in past 6 months     Please review last PHQ-9 score.           Passed - Medication is active on med list        Passed - Patient is age 18 or older        Passed - Recent (6 mo) or future (30 days) visit within the authorizing provider's specialty     Patient had office visit in the last 6 months or has a visit in the next 30 days with authorizing provider or within the authorizing provider's specialty.  See \"Patient Info\" tab in inbasket, or \"Choose Columns\" in Meds & Orders section of the refill encounter.             Signed Prescriptions Disp Refills    traZODone (DESYREL) 100 MG tablet 90 tablet 1     Sig: TAKE 1 TABLET BY MOUTH AT BEDTIME       Serotonin Modulators Passed - 6/30/2023 12:22 AM        Passed - Recent (12 mo) or future (30 days) visit within the authorizing provider's specialty     Patient has had an office visit with the authorizing provider or a provider within the authorizing providers department within the previous 12 mos or has a future within next 30 days. See \"Patient Info\" tab in inbasket, or \"Choose Columns\" in Meds & Orders section of the refill encounter.              Passed - Medication is active on med list        Passed - Patient is age 18 or older             Ericka Gutierrez RN 06/30/23 12:43 PM  "

## 2023-07-28 NOTE — PATIENT INSTRUCTIONS
Clarify and refill sertraline and trazodone    Change trazodone to 100 mg tabs at bed    Update labs    Tdap   No (0)

## 2023-08-11 ENCOUNTER — MYC MEDICAL ADVICE (OUTPATIENT)
Dept: PHARMACY | Facility: CLINIC | Age: 38
End: 2023-08-11
Payer: COMMERCIAL

## 2023-08-11 DIAGNOSIS — E66.811 CLASS 1 OBESITY WITHOUT SERIOUS COMORBIDITY WITH BODY MASS INDEX (BMI) OF 32.0 TO 32.9 IN ADULT, UNSPECIFIED OBESITY TYPE: Primary | ICD-10-CM

## 2023-08-12 DIAGNOSIS — E66.811 CLASS 1 OBESITY WITHOUT SERIOUS COMORBIDITY WITH BODY MASS INDEX (BMI) OF 32.0 TO 32.9 IN ADULT, UNSPECIFIED OBESITY TYPE: ICD-10-CM

## 2023-08-12 RX ORDER — TIRZEPATIDE 7.5 MG/.5ML
7.5 INJECTION, SOLUTION SUBCUTANEOUS
Qty: 2 ML | Refills: 11 | Status: SHIPPED | OUTPATIENT
Start: 2023-08-12 | End: 2023-08-14

## 2023-08-12 NOTE — TELEPHONE ENCOUNTER
"Routing refill request to provider for review/approval because:  Labs out of range:  A1C, Cr    Last Written Prescription Date:  7/14/23  Last Fill Quantity: 2 mL,  # refills: 0   Last office visit provider:  12/26/22     Requested Prescriptions   Pending Prescriptions Disp Refills    MOUNJARO 7.5 MG/0.5ML pen [Pharmacy Med Name: MOUNJARO 7.5 MG/0.5 ML PEN]       Sig: INJECT 7.5 MG SUBCUTANEOUS EVERY 7 DAYS       GLP-1 Agonists Protocol Failed - 8/12/2023  8:30 AM        Failed - HgbA1C in past 3 or 6 months     If HgbA1C is 8 or greater, it needs to be on file within the past 3 months.  If less than 8, must be on file within the past 6 months.     No lab results found.          Failed - Normal serum creatinine on file in past 12 months     No lab results found.    Ok to refill medication if creatinine is low          Passed - Medication is active on med list        Passed - Patient is age 18 or older        Passed - Recent (6 mo) or future (30 days) visit within the authorizing provider's specialty     Patient had office visit in the last 6 months or has a visit in the next 30 days with authorizing provider.  See \"Patient Info\" tab in inbasket, or \"Choose Columns\" in Meds & Orders section of the refill encounter.                 Chantal Solomon 08/12/23 8:46 AM  "

## 2023-08-14 RX ORDER — TIRZEPATIDE 10 MG/.5ML
10 INJECTION, SOLUTION SUBCUTANEOUS
Qty: 2 ML | Refills: 0 | Status: SHIPPED | OUTPATIENT
Start: 2023-08-14 | End: 2023-09-08

## 2023-08-14 NOTE — TELEPHONE ENCOUNTER
Mounjaro 10 mg dose was sent to [facility]. Patient has tolerated the 7.5 mg weekly and appropriate to increase it.         Joe García, PharmD     Medication Therapy Management (MTM) Pharmacist     [phone]     [email or web address]

## 2023-09-08 ENCOUNTER — TELEPHONE (OUTPATIENT)
Dept: INTERNAL MEDICINE | Facility: CLINIC | Age: 38
End: 2023-09-08
Payer: COMMERCIAL

## 2023-09-08 DIAGNOSIS — E66.01 MORBID OBESITY (H): Primary | ICD-10-CM

## 2023-09-08 RX ORDER — TIRZEPATIDE 12.5 MG/.5ML
12.5 INJECTION, SOLUTION SUBCUTANEOUS
Qty: 2 ML | Refills: 0 | Status: SHIPPED | OUTPATIENT
Start: 2023-09-08 | End: 2023-10-09

## 2023-09-08 NOTE — TELEPHONE ENCOUNTER
New Mounjaro dose was sent to patient's preferred pharmacy.          Thanks,    Joe García, PharmD     Medication Therapy Management (MTM) Pharmacist     [phone]     [email or web address]

## 2023-09-11 ENCOUNTER — TELEPHONE (OUTPATIENT)
Dept: INTERNAL MEDICINE | Facility: CLINIC | Age: 38
End: 2023-09-11

## 2023-09-12 NOTE — TELEPHONE ENCOUNTER
PRIOR AUTHORIZATION DENIED    Medication: TIRZEPATIDE 12.5 MG/0.5ML SC SOPN  Insurance Company: [employer] - Phone [phone] Fax [phone]  Denial Date: 9/9/2023  Denial Rational:     Appeal Information:     Patient Notified: No

## 2023-10-02 RX ORDER — MINOXIDIL 2.5 MG/1
2.5 TABLET ORAL DAILY
Qty: 30 TABLET | Refills: 11 | Status: CANCELLED | OUTPATIENT
Start: 2023-10-02 | End: 2024-10-01

## 2023-10-03 DIAGNOSIS — L65.9 ALOPECIA: Primary | ICD-10-CM

## 2023-10-03 RX ORDER — MINOXIDIL 2.5 MG/1
2.5 TABLET ORAL DAILY
Qty: 30 TABLET | Refills: 11 | Status: SHIPPED | OUTPATIENT
Start: 2023-10-03 | End: 2023-11-01

## 2023-10-08 ENCOUNTER — MYC MEDICAL ADVICE (OUTPATIENT)
Dept: PHARMACY | Facility: CLINIC | Age: 38
End: 2023-10-08
Payer: COMMERCIAL

## 2023-10-08 DIAGNOSIS — E66.811 CLASS 1 OBESITY WITHOUT SERIOUS COMORBIDITY WITH BODY MASS INDEX (BMI) OF 32.0 TO 32.9 IN ADULT, UNSPECIFIED OBESITY TYPE: Primary | ICD-10-CM

## 2023-10-09 DIAGNOSIS — E66.01 MORBID OBESITY (H): ICD-10-CM

## 2023-10-09 RX ORDER — TIRZEPATIDE 15 MG/.5ML
15 INJECTION, SOLUTION SUBCUTANEOUS
Qty: 6 ML | Refills: 4 | Status: SHIPPED | OUTPATIENT
Start: 2023-10-09 | End: 2023-11-07

## 2023-10-09 RX ORDER — TIRZEPATIDE 12.5 MG/.5ML
12.5 INJECTION, SOLUTION SUBCUTANEOUS
Qty: 2 ML | Refills: 11 | Status: SHIPPED | OUTPATIENT
Start: 2023-10-09 | End: 2023-11-07

## 2023-10-09 NOTE — TELEPHONE ENCOUNTER
Mounjaro dose was sent to the patient's preferred pharmacy.        Thanks,    Joe García, PharmD     Medication Therapy Management (MTM) Pharmacist     [phone]     [email or web address]

## 2023-10-12 ENCOUNTER — TELEPHONE (OUTPATIENT)
Dept: INTERNAL MEDICINE | Facility: CLINIC | Age: 38
End: 2023-10-12

## 2023-10-12 NOTE — TELEPHONE ENCOUNTER
Will have PCP address as non-urgent and may require further discussion    Mele Porter MD on 10/12/2023 at 5:24 PM

## 2023-10-12 NOTE — TELEPHONE ENCOUNTER
tirzepatide (MOUNJARO) 15 MG/0.5ML pen   fax states to send alternative or start a PA.  The PA was denied, please send an alternative

## 2023-10-15 ENCOUNTER — HEALTH MAINTENANCE LETTER (OUTPATIENT)
Age: 38
End: 2023-10-15

## 2023-10-27 DIAGNOSIS — L65.9 ALOPECIA: ICD-10-CM

## 2023-10-30 RX ORDER — MINOXIDIL 2.5 MG/1
2.5 TABLET ORAL DAILY
Qty: 90 TABLET | Refills: 11 | OUTPATIENT
Start: 2023-10-30

## 2023-11-01 DIAGNOSIS — L65.9 ALOPECIA: ICD-10-CM

## 2023-11-01 RX ORDER — MINOXIDIL 2.5 MG/1
2.5 TABLET ORAL DAILY
Qty: 30 TABLET | Refills: 11 | Status: SHIPPED | OUTPATIENT
Start: 2023-11-01

## 2023-11-07 ENCOUNTER — MYC MEDICAL ADVICE (OUTPATIENT)
Dept: PHARMACY | Facility: CLINIC | Age: 38
End: 2023-11-07
Payer: COMMERCIAL

## 2023-11-07 DIAGNOSIS — E66.811 CLASS 1 OBESITY WITHOUT SERIOUS COMORBIDITY WITH BODY MASS INDEX (BMI) OF 32.0 TO 32.9 IN ADULT, UNSPECIFIED OBESITY TYPE: ICD-10-CM

## 2023-11-07 RX ORDER — TIRZEPATIDE 15 MG/.5ML
15 INJECTION, SOLUTION SUBCUTANEOUS
Qty: 2 ML | Refills: 11 | Status: SHIPPED | OUTPATIENT
Start: 2023-11-07

## 2023-11-07 NOTE — TELEPHONE ENCOUNTER
Sent mounjaro 15 mg weekly dose to [facility] at [place].       Thanks,    Joe García, PharmD     Medication Therapy Management (MTM) Pharmacist     [phone]     [email or web address]

## 2023-12-26 ENCOUNTER — MYC MEDICAL ADVICE (OUTPATIENT)
Dept: INTERNAL MEDICINE | Facility: CLINIC | Age: 38
End: 2023-12-26
Payer: COMMERCIAL

## 2023-12-26 DIAGNOSIS — L65.9 ALOPECIA: ICD-10-CM

## 2023-12-26 DIAGNOSIS — F32.1 MAJOR DEPRESSIVE DISORDER, SINGLE EPISODE, MODERATE (H): ICD-10-CM

## 2023-12-26 DIAGNOSIS — F41.1 GENERALIZED ANXIETY DISORDER: ICD-10-CM

## 2023-12-26 DIAGNOSIS — F51.01 PRIMARY INSOMNIA: ICD-10-CM

## 2023-12-27 RX ORDER — FINASTERIDE 5 MG/1
1 TABLET, FILM COATED ORAL EVERY OTHER DAY
Qty: 45 TABLET | Refills: 3 | Status: SHIPPED | OUTPATIENT
Start: 2023-12-27

## 2023-12-27 RX ORDER — TRAZODONE HYDROCHLORIDE 100 MG/1
100 TABLET ORAL AT BEDTIME
Qty: 90 TABLET | Refills: 1 | Status: SHIPPED | OUTPATIENT
Start: 2023-12-27 | End: 2024-06-20

## 2024-01-31 DIAGNOSIS — E66.01 MORBID OBESITY (H): Primary | ICD-10-CM

## 2024-01-31 RX ORDER — TIRZEPATIDE 10 MG/.5ML
INJECTION, SOLUTION SUBCUTANEOUS
Qty: 2 ML | Refills: 11 | Status: SHIPPED | OUTPATIENT
Start: 2024-01-31

## 2024-02-01 ENCOUNTER — TELEPHONE (OUTPATIENT)
Dept: INTERNAL MEDICINE | Facility: CLINIC | Age: 39
End: 2024-02-01

## 2024-02-01 NOTE — TELEPHONE ENCOUNTER
PRIOR AUTHORIZATION DENIED    Medication: MOUNJARO 10 MG/0.5ML SC SOPN  Insurance Company: [employer] - Phone [phone] Fax [phone]  Denial Date: 1/31/2024  Denial Reason(s): MEDICATION IS NOT COVERED FOR DX - only covered for DMII  POSSIBLE ALTERNATIVES: Saxenda or Wegovy    Appeal Information:     Patient Notified: NO

## 2024-06-19 ENCOUNTER — MYC MEDICAL ADVICE (OUTPATIENT)
Dept: INTERNAL MEDICINE | Facility: CLINIC | Age: 39
End: 2024-06-19
Payer: COMMERCIAL

## 2024-06-19 DIAGNOSIS — E66.01 MORBID OBESITY (H): Primary | ICD-10-CM

## 2024-06-19 NOTE — TELEPHONE ENCOUNTER
MD please review Rx/dose change request and advise.    Rx pended to review, update/change -thanks!

## 2024-06-20 ENCOUNTER — MYC REFILL (OUTPATIENT)
Dept: INTERNAL MEDICINE | Facility: CLINIC | Age: 39
End: 2024-06-20
Payer: COMMERCIAL

## 2024-06-20 DIAGNOSIS — F41.1 GENERALIZED ANXIETY DISORDER: ICD-10-CM

## 2024-06-20 DIAGNOSIS — F32.1 MAJOR DEPRESSIVE DISORDER, SINGLE EPISODE, MODERATE (H): ICD-10-CM

## 2024-06-20 DIAGNOSIS — F51.01 PRIMARY INSOMNIA: ICD-10-CM

## 2024-06-21 DIAGNOSIS — F41.1 GENERALIZED ANXIETY DISORDER: ICD-10-CM

## 2024-06-21 DIAGNOSIS — F51.01 PRIMARY INSOMNIA: ICD-10-CM

## 2024-06-21 DIAGNOSIS — F32.1 MAJOR DEPRESSIVE DISORDER, SINGLE EPISODE, MODERATE (H): ICD-10-CM

## 2024-06-21 RX ORDER — TRAZODONE HYDROCHLORIDE 100 MG/1
100 TABLET ORAL AT BEDTIME
Qty: 90 TABLET | Refills: 1 | Status: SHIPPED | OUTPATIENT
Start: 2024-06-21

## 2024-07-02 DIAGNOSIS — F32.1 MAJOR DEPRESSIVE DISORDER, SINGLE EPISODE, MODERATE (H): ICD-10-CM

## 2024-07-02 DIAGNOSIS — F41.1 GENERALIZED ANXIETY DISORDER: ICD-10-CM

## 2024-07-02 RX ORDER — SERTRALINE HYDROCHLORIDE 100 MG/1
150 TABLET, FILM COATED ORAL DAILY
Qty: 135 TABLET | Refills: 3 | Status: SHIPPED | OUTPATIENT
Start: 2024-07-02

## 2024-10-01 DIAGNOSIS — L65.9 ALOPECIA: ICD-10-CM

## 2024-10-01 RX ORDER — FINASTERIDE 5 MG/1
1 TABLET, FILM COATED ORAL EVERY OTHER DAY
Qty: 45 TABLET | Refills: 3 | OUTPATIENT
Start: 2024-10-01

## 2024-11-26 DIAGNOSIS — E66.01 MORBID OBESITY (H): Primary | ICD-10-CM

## 2024-11-26 RX ORDER — TIRZEPATIDE 15 MG/.5ML
INJECTION, SOLUTION SUBCUTANEOUS
Qty: 2 ML | Refills: 10 | Status: SHIPPED | OUTPATIENT
Start: 2024-11-26

## 2024-12-08 ENCOUNTER — HEALTH MAINTENANCE LETTER (OUTPATIENT)
Age: 39
End: 2024-12-08

## 2025-01-25 DIAGNOSIS — L65.9 ALOPECIA: ICD-10-CM

## 2025-01-25 RX ORDER — MINOXIDIL 2.5 MG/1
2.5 TABLET ORAL DAILY
Qty: 90 TABLET | Refills: 3 | Status: SHIPPED | OUTPATIENT
Start: 2025-01-25

## 2025-06-08 DIAGNOSIS — F41.1 GENERALIZED ANXIETY DISORDER: ICD-10-CM

## 2025-06-08 DIAGNOSIS — F51.01 PRIMARY INSOMNIA: ICD-10-CM

## 2025-06-08 DIAGNOSIS — F32.1 MAJOR DEPRESSIVE DISORDER, SINGLE EPISODE, MODERATE (H): ICD-10-CM

## 2025-06-09 RX ORDER — TRAZODONE HYDROCHLORIDE 100 MG/1
100 TABLET ORAL AT BEDTIME
Qty: 90 TABLET | Refills: 1 | Status: SHIPPED | OUTPATIENT
Start: 2025-06-09

## 2025-07-12 NOTE — TELEPHONE ENCOUNTER
RN cannot approve Refill Request    RN can NOT refill this medication med is not covered by policy/route to provider. Last office visit: Visit date not found Last Physical: 3/27/2017 Last MTM visit: Visit date not found Last visit same specialty: 8/30/2018 Ros Nunez MD.  Next visit within 3 mo: Visit date not found  Next physical within 3 mo: Visit date not found      Kyra Rush, [facility] Triage/Med Refill 10/5/2019    Requested Prescriptions   Pending Prescriptions Disp Refills     finasteride (PROSCAR) 5 mg tablet [Pharmacy Med Name: FINASTERIDE 5 MG TABLET] 45 tablet 2     Sig: TAKE ONE-HALF TABLET (2.5 MG TOTAL) BY MOUTH DAILY.       There is no refill protocol information for this order           
I have personally evaluated and examined the patient. The Attending was available to me as a supervising provider if needed.

## 2025-08-09 DIAGNOSIS — F32.1 MAJOR DEPRESSIVE DISORDER, SINGLE EPISODE, MODERATE (H): ICD-10-CM

## 2025-08-09 DIAGNOSIS — F41.1 GENERALIZED ANXIETY DISORDER: ICD-10-CM

## 2025-08-11 RX ORDER — SERTRALINE HYDROCHLORIDE 100 MG/1
150 TABLET, FILM COATED ORAL DAILY
Qty: 135 TABLET | Refills: 3 | Status: SHIPPED | OUTPATIENT
Start: 2025-08-11